# Patient Record
Sex: MALE | Race: WHITE | NOT HISPANIC OR LATINO | URBAN - METROPOLITAN AREA
[De-identification: names, ages, dates, MRNs, and addresses within clinical notes are randomized per-mention and may not be internally consistent; named-entity substitution may affect disease eponyms.]

---

## 2017-08-18 ENCOUNTER — TELEPHONE (OUTPATIENT)
Dept: ENDOCRINOLOGY | Facility: CLINIC | Age: 62
End: 2017-08-18

## 2017-08-18 NOTE — TELEPHONE ENCOUNTER
----- Message from Emilia Brandt sent at 8/16/2017  2:00 PM CDT -----  Contact: Chen / [facility] / [phone]  Chen wants to know if her clinic will need to refer the pt to GI also.

## 2017-08-28 ENCOUNTER — LAB VISIT (OUTPATIENT)
Dept: LAB | Facility: HOSPITAL | Age: 62
End: 2017-08-28
Payer: MEDICARE

## 2017-08-28 ENCOUNTER — OFFICE VISIT (OUTPATIENT)
Dept: ENDOCRINOLOGY | Facility: CLINIC | Age: 62
End: 2017-08-28
Payer: MEDICARE

## 2017-08-28 VITALS
BODY MASS INDEX: 30.45 KG/M2 | WEIGHT: 244.94 LBS | HEART RATE: 67 BPM | RESPIRATION RATE: 16 BRPM | SYSTOLIC BLOOD PRESSURE: 168 MMHG | HEIGHT: 75 IN | DIASTOLIC BLOOD PRESSURE: 88 MMHG

## 2017-08-28 DIAGNOSIS — E11.65 TYPE 2 DIABETES MELLITUS WITH HYPERGLYCEMIA, WITH LONG-TERM CURRENT USE OF INSULIN: ICD-10-CM

## 2017-08-28 DIAGNOSIS — Z95.1 S/P CABG (CORONARY ARTERY BYPASS GRAFT): ICD-10-CM

## 2017-08-28 DIAGNOSIS — Z79.4 TYPE 2 DIABETES MELLITUS WITH HYPERGLYCEMIA, WITH LONG-TERM CURRENT USE OF INSULIN: ICD-10-CM

## 2017-08-28 DIAGNOSIS — Z79.4 TYPE 2 DIABETES MELLITUS WITH HYPERGLYCEMIA, WITH LONG-TERM CURRENT USE OF INSULIN: Primary | ICD-10-CM

## 2017-08-28 DIAGNOSIS — K85.80 OTHER PANCREATITIS: ICD-10-CM

## 2017-08-28 DIAGNOSIS — E66.9 OBESITY (BMI 30.0-34.9): ICD-10-CM

## 2017-08-28 DIAGNOSIS — E11.65 TYPE 2 DIABETES MELLITUS WITH HYPERGLYCEMIA, WITH LONG-TERM CURRENT USE OF INSULIN: Primary | ICD-10-CM

## 2017-08-28 DIAGNOSIS — E78.2 MIXED HYPERLIPIDEMIA: ICD-10-CM

## 2017-08-28 DIAGNOSIS — I10 ESSENTIAL HYPERTENSION: ICD-10-CM

## 2017-08-28 PROBLEM — K85.90 PANCREATITIS: Status: ACTIVE | Noted: 2017-08-28

## 2017-08-28 LAB
ALBUMIN SERPL BCP-MCNC: 3.3 G/DL
ALP SERPL-CCNC: 124 U/L
ALT SERPL W/O P-5'-P-CCNC: 47 U/L
ANION GAP SERPL CALC-SCNC: 7 MMOL/L
AST SERPL-CCNC: 32 U/L
BILIRUB SERPL-MCNC: 0.6 MG/DL
BUN SERPL-MCNC: 26 MG/DL
CALCIUM SERPL-MCNC: 9.4 MG/DL
CHLORIDE SERPL-SCNC: 98 MMOL/L
CO2 SERPL-SCNC: 33 MMOL/L
CREAT SERPL-MCNC: 1.6 MG/DL
CREAT UR-MCNC: 84 MG/DL
EST. GFR  (AFRICAN AMERICAN): 52.6 ML/MIN/1.73 M^2
EST. GFR  (NON AFRICAN AMERICAN): 45.5 ML/MIN/1.73 M^2
GLUCOSE SERPL-MCNC: 294 MG/DL
LIPASE SERPL-CCNC: 274 U/L
MICROALBUMIN UR DL<=1MG/L-MCNC: <2.5 UG/ML
MICROALBUMIN/CREATININE RATIO: NORMAL UG/MG
POTASSIUM SERPL-SCNC: 4.5 MMOL/L
PROT SERPL-MCNC: 6.9 G/DL
SODIUM SERPL-SCNC: 138 MMOL/L
TSH SERPL DL<=0.005 MIU/L-ACNC: 1.08 UIU/ML

## 2017-08-28 PROCEDURE — 4010F ACE/ARB THERAPY RXD/TAKEN: CPT | Mod: ,,, | Performed by: INTERNAL MEDICINE

## 2017-08-28 PROCEDURE — 80053 COMPREHEN METABOLIC PANEL: CPT

## 2017-08-28 PROCEDURE — 36415 COLL VENOUS BLD VENIPUNCTURE: CPT

## 2017-08-28 PROCEDURE — 84443 ASSAY THYROID STIM HORMONE: CPT

## 2017-08-28 PROCEDURE — 3077F SYST BP >= 140 MM HG: CPT | Mod: ,,, | Performed by: INTERNAL MEDICINE

## 2017-08-28 PROCEDURE — 99204 OFFICE O/P NEW MOD 45 MIN: CPT | Mod: S$PBB,,, | Performed by: INTERNAL MEDICINE

## 2017-08-28 PROCEDURE — 99999 PR PBB SHADOW E&M-EST. PATIENT-LVL III: CPT | Mod: PBBFAC,,, | Performed by: INTERNAL MEDICINE

## 2017-08-28 PROCEDURE — 3079F DIAST BP 80-89 MM HG: CPT | Mod: ,,, | Performed by: INTERNAL MEDICINE

## 2017-08-28 PROCEDURE — 83690 ASSAY OF LIPASE: CPT

## 2017-08-28 RX ORDER — INSULIN GLARGINE 300 [IU]/ML
50 INJECTION, SOLUTION SUBCUTANEOUS DAILY
COMMUNITY
End: 2018-04-12 | Stop reason: CLARIF

## 2017-08-28 RX ORDER — FENOFIBRATE 160 MG/1
160 TABLET ORAL DAILY
Qty: 90 TABLET | Refills: 3 | Status: SHIPPED | OUTPATIENT
Start: 2017-08-28 | End: 2018-08-28

## 2017-08-28 RX ORDER — GLIMEPIRIDE 4 MG/1
4 TABLET ORAL
COMMUNITY
End: 2017-08-28

## 2017-08-28 RX ORDER — MELOXICAM 7.5 MG/1
7.5 TABLET ORAL
COMMUNITY

## 2017-08-28 RX ORDER — ATORVASTATIN CALCIUM 20 MG/1
20 TABLET, FILM COATED ORAL
COMMUNITY

## 2017-08-28 RX ORDER — INSULIN ASPART 100 [IU]/ML
15 INJECTION, SOLUTION INTRAVENOUS; SUBCUTANEOUS
Qty: 2 BOX | Refills: 3 | Status: SHIPPED | OUTPATIENT
Start: 2017-08-28 | End: 2018-08-28

## 2017-08-28 RX ORDER — TRAMADOL HYDROCHLORIDE 50 MG/1
50 TABLET ORAL
COMMUNITY

## 2017-08-28 NOTE — LETTER
August 28, 2017      Lilliana Michelle MD  [address] [place] [address] [place] [address]           [facility] - Endo/Diab/Metab  [address]  [facility] [address]  Phone: [phone]  Fax: [phone]          Patient: Hola Briggs   MR Number: [id number]   YOB: 1955   Date of Visit: 8/28/2017       Dear Lilliana Michelle:    Thank you for referring Hola Briggs to me for evaluation. Attached you will find relevant portions of my assessment and plan of care.    If you have questions, please do not hesitate to call me. I look forward to following Hola Briggs along with you.    Sincerely,    Lucy Colby, NP    Enclosure  CC:  No Recipients    If you would like to receive this communication electronically, please contact [email or web address] or [phone] to request more information on [employer] access.    For providers and/or their staff who would like to refer a patient to [facility], please contact us through our one-stop-shop provider referral line, [facility] , at [phone].    If you feel you have received this communication in error or would no longer like to receive these types of communications, please e-mail [email or web address]

## 2017-08-28 NOTE — PROGRESS NOTES
Chief Complaint: Diabetes Mellitus      HPI:  Hola Briggs is 61 y.o. male here for the first time for diabetes management.    Diabetes was first diagnosed in , based on labs   Initially started treatment with oral medications   Started toujeo in      Recently diagnosed with pancreatitis in 2017. Presented with severe abdominal pain, nausea and vomiting. He was hospitalized at [facility] in [place], [place]     Previously on Metformin but was discontinued by PCP in [place] in 2017 due to elevated creatinine     Current DM Regimen:   Januvia 100 mg qd   Glimepiride 4mg qd  Toujeo 77 units at bedtime     Currently he endorses polydipsia and blurred vision     He denies nocturia, polyuria, unexplained weight loss     Hypoglycemia: denies recent episodes or symptoms   denies hospital admission related to diabetes.    Last diabetic eye exam: 2017, outside ophthalmologist in [place]   +cataracts   No evidence of retinopathy     Known complications: endorses tingling sensations to bilateral lower extremities     He does not follow with podiatry     reports symptomatic CAD or CVD. Has history of 2 vessel CABG and PCI in 2013 in [place]     24 hour dietary recall:   Breakfast: cottage cheese and peaches   Lunch: sandwich and baked potato   Dinner: pork chops, mashed potatoes and black eyed peas    Snacks: small pack of lemon cookies, 1-2 packs of cheese crackers     SMBG: tests BG once daily  Personal review of glucose meter   Fastin, 208, 199, 153, 207, 273, 212, 231, 211, 261    Diabetes education: Yes, at initial diagnosis   Exercise: has gym membership but not an active member     ADA STANDARDS OF CARE:   ACE inhibitor or angiotension II receptor blocker: lisinopril   Statin drug: lipitor   Low dose ASA: 81 mg daily       Review of Systems   Constitutional: Negative for unexpected weight change.   Eyes: Positive for visual disturbance (blurry ).   Respiratory: Positive for  shortness of breath.    Cardiovascular: Positive for chest pain and palpitations. Negative for leg swelling.   Gastrointestinal: Positive for abdominal pain. Negative for constipation, diarrhea, nausea and vomiting.   Endocrine: Positive for polydipsia. Negative for polyphagia and polyuria.   Genitourinary: Negative for dysuria and hematuria.   Musculoskeletal: Negative for myalgias.   Skin: Negative for wound.   Allergic/Immunologic: Negative for immunocompromised state.   Neurological: Negative for headaches.   Hematological: Does not bruise/bleed easily.   Psychiatric/Behavioral: Negative for sleep disturbance.     Physical Exam   Constitutional: He is oriented to person, place, and time. He appears well-developed. No distress.   HENT:   Head: Atraumatic.   Right Ear: External ear normal.   Left Ear: External ear normal.   Nose: Nose normal.   Hearing normal    Neck: No tracheal deviation present. No thyromegaly present.   Cardiovascular: Normal rate and regular rhythm.    No murmur heard.  Pulses:       Dorsalis pedis pulses are 2+ on the right side, and 2+ on the left side.   Pulmonary/Chest: Effort normal and breath sounds normal.   Abdominal: Soft. Bowel sounds are normal. He exhibits no distension and no mass.   No hernia noted   Musculoskeletal: He exhibits no edema.        Right foot: There is no deformity.        Left foot: There is no deformity.   Feet:   Right Foot:   Protective Sensation: 10 sites tested. 10 sites sensed.   Skin Integrity: Negative for ulcer, blister, skin breakdown, callus or dry skin.   Left Foot:   Protective Sensation: 10 sites tested. 10 sites sensed.   Skin Integrity: Negative for ulcer, blister, skin breakdown, callus or dry skin.   Neurological: He is alert and oriented to person, place, and time. No sensory deficit. Coordination and gait normal.   Vibratory sensation intact bilaterally      Skin: Skin is warm and dry. No rash noted.   No induration   injection sites are ok. No  lipo hypertropthy or atrophy    +acanthosis and skin tags       Psychiatric: He has a normal mood and affect. Judgment normal.   Nursing note and vitals reviewed.    Labs:  Outside labs per [facility] 08/2017:   Glucose - 342 mg/dL   A1c- - 9.3%  Creatinine - 1.4  Total cholesterol - 176  Triglycerides - 462  HDL- 36  LDL- 49.6    No results found for: HGBA1C  Lab Results   Component Value Date    CHOL 148 10/10/2014    HDL 37 (L) 10/10/2014    LDLCALC 64.6 10/10/2014    TRIG 232 (H) 10/10/2014    CHOLHDL 25.0 10/10/2014     Lab Results   Component Value Date     (L) 10/29/2014    K 4.2 10/29/2014    CL 97 10/29/2014    CO2 26 10/29/2014     (HH) 10/29/2014    BUN 29 (H) 10/29/2014    CREATININE 1.5 (H) 10/29/2014    CALCIUM 9.2 10/29/2014    PROT 7.3 10/10/2014    ALBUMIN 3.7 10/10/2014    BILITOT 0.6 10/10/2014    ALKPHOS 96 10/10/2014    AST 32 10/10/2014    ALT 24 10/10/2014    ANIONGAP 10 10/29/2014    ESTGFRAFRICA 57.7 (A) 10/29/2014    EGFRNONAA 49.9 (A) 10/29/2014       Assessment and Plan:  1. Type 2 diabetes mellitus with hyperglycemia, with long-term current use of insulin  -- discussed disease progression, risks and complications of uncontrolled diabetes   -- encouraged dietary and lifestyle modifications   -- discontinue Januvia secondary to recent dx of pancreatitis   -- avoid GLP 1 RA   -- discontinue Glimepiride   -- assess eGFR , if wnl , will resume Metformin   -- start MDI with Novolog 15/15/15 and Toujeo 50 units at bedtime   -- smbg 4x daily and send logs in 2 weeks for adjustments     2. Other pancreatitis  -- avoid ETOH use   -- optimize BG control   -- start fibrate for hypertriglyceridemia   -- check lipase today   -- keep follow up scheduled for Wednesday 8/30/2017 with outside Gastroenterologist in [place]    3. Essential hypertension  -- on ACEi per ADA recommendations   -- encouraged low sodium diet     4. Mixed hyperlipidemia  -- on statin per ADA recommendations   --  encouraged to follow a low fat,low chol diet   -- start fenofibrate   -- repeat lipid panel when fasting     5. S/P CABG (coronary artery bypass graft)  -- arrange follow up with cardiology     Send logs in 2 weeks   The patient is instructed to notify the office with BG concerns or questions       RTC in 6 weeks for follow up     Case discussed in consultation with Dr. Campbell   Recommendations were discussed with the patient in detail   The patient verbalized understanding and agrees with the treatment plan as outlined above

## 2017-10-06 ENCOUNTER — OFFICE VISIT (OUTPATIENT)
Dept: ENDOCRINOLOGY | Facility: CLINIC | Age: 62
End: 2017-10-06
Payer: MEDICARE

## 2017-10-06 ENCOUNTER — TELEPHONE (OUTPATIENT)
Dept: ENDOCRINOLOGY | Facility: CLINIC | Age: 62
End: 2017-10-06

## 2017-10-06 VITALS
SYSTOLIC BLOOD PRESSURE: 170 MMHG | BODY MASS INDEX: 31.14 KG/M2 | HEIGHT: 75 IN | RESPIRATION RATE: 18 BRPM | WEIGHT: 250.44 LBS | DIASTOLIC BLOOD PRESSURE: 96 MMHG | HEART RATE: 61 BPM

## 2017-10-06 DIAGNOSIS — Z79.4 TYPE 2 DIABETES MELLITUS WITH HYPERGLYCEMIA, WITH LONG-TERM CURRENT USE OF INSULIN: Primary | ICD-10-CM

## 2017-10-06 DIAGNOSIS — E11.65 TYPE 2 DIABETES MELLITUS WITH HYPERGLYCEMIA, WITH LONG-TERM CURRENT USE OF INSULIN: Primary | ICD-10-CM

## 2017-10-06 DIAGNOSIS — H25.13 AGE-RELATED NUCLEAR CATARACT OF BOTH EYES: ICD-10-CM

## 2017-10-06 DIAGNOSIS — Z95.1 S/P CABG (CORONARY ARTERY BYPASS GRAFT): ICD-10-CM

## 2017-10-06 PROBLEM — H26.9 CATARACTS, BILATERAL: Status: ACTIVE | Noted: 2017-10-06

## 2017-10-06 PROCEDURE — 99999 PR PBB SHADOW E&M-EST. PATIENT-LVL III: CPT | Mod: PBBFAC,,, | Performed by: INTERNAL MEDICINE

## 2017-10-06 PROCEDURE — 99213 OFFICE O/P EST LOW 20 MIN: CPT | Mod: PBBFAC | Performed by: INTERNAL MEDICINE

## 2017-10-06 PROCEDURE — 99214 OFFICE O/P EST MOD 30 MIN: CPT | Mod: S$PBB,,, | Performed by: INTERNAL MEDICINE

## 2017-10-06 RX ORDER — OFLOXACIN 3 MG/ML
1 SOLUTION/ DROPS OPHTHALMIC 4 TIMES DAILY
Qty: 1 BOTTLE | Refills: 0
Start: 2017-10-06

## 2017-10-06 RX ORDER — METFORMIN HYDROCHLORIDE 500 MG/1
500 TABLET, EXTENDED RELEASE ORAL DAILY
Qty: 90 TABLET | Refills: 3 | Status: SHIPPED | OUTPATIENT
Start: 2017-10-06 | End: 2018-10-06

## 2017-10-06 NOTE — PROGRESS NOTES
Chief Complaint: Diabetes Mellitus      HPI:  Hola Briggs is 61 y.o. male here today for T2DM follow up     He was diagnosed with T2DM in , , based on labs   Initially started treatment with oral medications   Started toujeo in      Recently diagnosed with pancreatitis in 2017. Presented with severe abdominal pain, nausea and vomiting. He was hospitalized at [facility] in [place], [place]     Current DM Regimen:   Novolog 15/15/15 + low dose correction scale   Toujeo 50 units daily     Other meds tried:   Januvia - discontinued at last visit due to recent diagnosis of pancreatitis   Glimepiride - discontinued due to risk of hypoglycemia     Currently he endorses polydipsia and blurred vision     He denies nocturia, polyuria, unexplained weight loss     Hypoglycemia: denies recent episodes or symptoms   denies hospital admission related to diabetes.    Last diabetic eye exam: 2017, outside ophthalmologist in [place]   +cataracts , scheduled for cataract surgery on 10/10/2017   No evidence of retinopathy     Known complications: neuropathy, CKD     Has upcoming podiatry appointment scheduled in [place]     Reports symptomatic CAD or CVD. Has history of 2 vessel CABG and PCI in 2013 in [place]     24 hour dietary recall:   Breakfast: crackers, steak and egg biscuit   Lunch: fish, filed peas    Dinner: grilled chicken, grilled pork chop, mashed potatoes, field pies    Snacks: 1-2 packs of cheese crackers     SMBG: tests BG 4x daily   Fastin, 210, 233, 231, 251, 232, 200, 161, 166, 164  Lunch: 114, 131, 157, 289, 170, 178, 147, 148, 166  Dinner: 346, 320, 209, 152, x , 153, 124, 174, 405,   Bedtime: 498, 245, 372, 360, 311, 289, 204, 242, 202    Diabetes education: Yes, at initial diagnosis   Exercise: has gym membership but not an active member     ADA STANDARDS OF CARE:   ACE inhibitor or angiotension II receptor blocker: lisinopril   Statin drug: lipitor   Low dose ASA: 81  mg daily       Review of Systems   Constitutional: Negative for unexpected weight change.   Eyes: Positive for visual disturbance (blurry ).   Respiratory: Positive for shortness of breath.    Cardiovascular: Positive for palpitations. Negative for chest pain and leg swelling.   Gastrointestinal: Negative for abdominal pain, constipation, diarrhea, nausea and vomiting.   Endocrine: Positive for polydipsia. Negative for polyphagia and polyuria.   Genitourinary: Negative for dysuria and hematuria.   Musculoskeletal: Negative for myalgias.   Skin: Negative for wound.   Allergic/Immunologic: Negative for immunocompromised state.   Neurological: Negative for headaches.   Hematological: Does not bruise/bleed easily.   Psychiatric/Behavioral: Negative for sleep disturbance.     Physical Exam   Constitutional: He is oriented to person, place, and time. He appears well-developed. No distress.   HENT:   Head: Atraumatic.   Right Ear: External ear normal.   Left Ear: External ear normal.   Nose: Nose normal.   Hearing normal    Neck: No tracheal deviation present. No thyromegaly present.   Cardiovascular: Normal rate and regular rhythm.    No murmur heard.  Pulses:       Dorsalis pedis pulses are 2+ on the right side, and 2+ on the left side.   Pulmonary/Chest: Effort normal and breath sounds normal.   Abdominal: Soft. Bowel sounds are normal. He exhibits no distension and no mass.   No hernia noted   Musculoskeletal: He exhibits no edema.        Right foot: There is no deformity.        Left foot: There is no deformity.   Feet:   Right Foot:   Protective Sensation: 10 sites tested. 10 sites sensed.   Skin Integrity: Negative for ulcer, blister, skin breakdown, callus or dry skin.   Left Foot:   Protective Sensation: 10 sites tested. 10 sites sensed.   Skin Integrity: Negative for ulcer, blister, skin breakdown, callus or dry skin.   Neurological: He is alert and oriented to person, place, and time. No sensory deficit.  Coordination and gait normal.   Vibratory sensation intact bilaterally      Skin: Skin is warm and dry. No rash noted.   No induration   injection sites are ok. No lipo hypertropthy or atrophy    +acanthosis and skin tags       Psychiatric: He has a normal mood and affect. Judgment normal.   Nursing note and vitals reviewed.    Labs:  Outside labs per [facility] 08/2017:   Glucose - 342 mg/dL   A1c- - 9.3%  Creatinine - 1.4  Total cholesterol - 176  Triglycerides - 462  HDL- 36  LDL- 49.6    No results found for: HGBA1C  Lab Results   Component Value Date    CHOL 148 10/10/2014    HDL 37 (L) 10/10/2014    LDLCALC 64.6 10/10/2014    TRIG 232 (H) 10/10/2014    CHOLHDL 25.0 10/10/2014     Lab Results   Component Value Date     08/28/2017    K 4.5 08/28/2017    CL 98 08/28/2017    CO2 33 (H) 08/28/2017     (H) 08/28/2017    BUN 26 (H) 08/28/2017    CREATININE 1.6 (H) 08/28/2017    CALCIUM 9.4 08/28/2017    PROT 6.9 08/28/2017    ALBUMIN 3.3 (L) 08/28/2017    BILITOT 0.6 08/28/2017    ALKPHOS 124 08/28/2017    AST 32 08/28/2017    ALT 47 (H) 08/28/2017    ANIONGAP 7 (L) 08/28/2017    ESTGFRAFRICA 52.6 (A) 08/28/2017    EGFRNONAA 45.5 (A) 08/28/2017       Assessment and Plan:  1. Type 2 diabetes mellitus with hyperglycemia, with long-term current use of insulin  -- discussed disease progression, risks and complications of uncontrolled diabetes   -- encouraged dietary and lifestyle modifications   -- avoid GLP 1 RA and DPP4i  -- continue current MDI regimen   -- add Metformin  mg - only with supper   -- check renal panel in 6-8 weeks  -- smbg 4x daily and send logs in 2 weeks for adjustments   -- reviewed signs and symptoms of hypoglycemia along with appropriate treatment protocol   -- labs prior to next visit     2. Other pancreatitis  -- avoid alcohol use   -- optimize BG control   -- followed by outside Gastroenterology     3. Essential hypertension  -- on ACEi per ADA recommendations   --  encouraged low sodium diet     4. Mixed hyperlipidemia  -- on statin per ADA recommendations   -- encouraged to follow a low fat,low chol diet      5. S/P CABG (coronary artery bypass graft)  -- arrange follow up with cardiology     Send logs in 2 weeks   The patient is instructed to notify the office with BG concerns or questions

## 2017-10-06 NOTE — TELEPHONE ENCOUNTER
"Attempted to call patient regarding 9am appointment scheduled this am with Lucy Colby NP- unable to reach patient.  Called [phone]- recording come on, "voice mail has not been set up yet".  Called [phone]- "voice mail has not been set up".  "

## 2017-12-13 ENCOUNTER — TELEPHONE (OUTPATIENT)
Dept: ENDOCRINOLOGY | Facility: CLINIC | Age: 62
End: 2017-12-13

## 2018-04-12 ENCOUNTER — PATIENT MESSAGE (OUTPATIENT)
Dept: ENDOCRINOLOGY | Facility: CLINIC | Age: 63
End: 2018-04-12

## 2018-04-12 DIAGNOSIS — E11.65 TYPE 2 DIABETES MELLITUS WITH HYPERGLYCEMIA, WITH LONG-TERM CURRENT USE OF INSULIN: Primary | ICD-10-CM

## 2018-04-12 DIAGNOSIS — Z79.4 TYPE 2 DIABETES MELLITUS WITH HYPERGLYCEMIA, WITH LONG-TERM CURRENT USE OF INSULIN: Primary | ICD-10-CM

## 2021-11-17 NOTE — TELEPHONE ENCOUNTER
"Patient notified via phone. Informed that we received and reviewed labs from [facility]. Informed that A1c is 7.3%. Patient voiced understanding. He states "everyting is going well". The patient denies hypoglycemia. He is informed to continue current treatment plan and to follow up with our office as advised. The patient verbalized understanding.   "
To get better and follow your care plan as instructed.